# Patient Record
Sex: MALE | Race: BLACK OR AFRICAN AMERICAN | NOT HISPANIC OR LATINO | URBAN - METROPOLITAN AREA
[De-identification: names, ages, dates, MRNs, and addresses within clinical notes are randomized per-mention and may not be internally consistent; named-entity substitution may affect disease eponyms.]

---

## 2018-02-22 ENCOUNTER — EMERGENCY (EMERGENCY)
Facility: HOSPITAL | Age: 33
LOS: 1 days | Discharge: HOME | End: 2018-02-22
Attending: EMERGENCY MEDICINE | Admitting: EMERGENCY MEDICINE
Payer: COMMERCIAL

## 2018-02-22 VITALS
TEMPERATURE: 98 F | DIASTOLIC BLOOD PRESSURE: 84 MMHG | RESPIRATION RATE: 18 BRPM | WEIGHT: 214.95 LBS | OXYGEN SATURATION: 98 % | HEIGHT: 70 IN | SYSTOLIC BLOOD PRESSURE: 128 MMHG | HEART RATE: 73 BPM

## 2018-02-22 VITALS
DIASTOLIC BLOOD PRESSURE: 76 MMHG | OXYGEN SATURATION: 100 % | SYSTOLIC BLOOD PRESSURE: 131 MMHG | HEART RATE: 73 BPM | RESPIRATION RATE: 17 BRPM | TEMPERATURE: 98 F

## 2018-02-22 LAB
ALBUMIN SERPL ELPH-MCNC: 4.2 G/DL — SIGNIFICANT CHANGE UP (ref 3.3–5)
ALP SERPL-CCNC: 82 U/L — SIGNIFICANT CHANGE UP (ref 40–120)
ALT FLD-CCNC: 125 U/L — HIGH (ref 12–78)
ANION GAP SERPL CALC-SCNC: 7 MMOL/L — SIGNIFICANT CHANGE UP (ref 5–17)
AST SERPL-CCNC: 80 U/L — HIGH (ref 15–37)
BASOPHILS # BLD AUTO: 0.1 K/UL — SIGNIFICANT CHANGE UP (ref 0–0.2)
BASOPHILS NFR BLD AUTO: 1.3 % — SIGNIFICANT CHANGE UP (ref 0–2)
BILIRUB SERPL-MCNC: 0.8 MG/DL — SIGNIFICANT CHANGE UP (ref 0.2–1.2)
BUN SERPL-MCNC: 16 MG/DL — SIGNIFICANT CHANGE UP (ref 7–23)
CALCIUM SERPL-MCNC: 9.4 MG/DL — SIGNIFICANT CHANGE UP (ref 8.5–10.1)
CHLORIDE SERPL-SCNC: 103 MMOL/L — SIGNIFICANT CHANGE UP (ref 96–108)
CO2 SERPL-SCNC: 26 MMOL/L — SIGNIFICANT CHANGE UP (ref 22–31)
CREAT SERPL-MCNC: 1 MG/DL — SIGNIFICANT CHANGE UP (ref 0.5–1.3)
EOSINOPHIL # BLD AUTO: 0.1 K/UL — SIGNIFICANT CHANGE UP (ref 0–0.5)
EOSINOPHIL NFR BLD AUTO: 1.3 % — SIGNIFICANT CHANGE UP (ref 0–6)
GLUCOSE SERPL-MCNC: 134 MG/DL — HIGH (ref 70–99)
HCT VFR BLD CALC: 48.6 % — SIGNIFICANT CHANGE UP (ref 39–50)
HGB BLD-MCNC: 16.2 G/DL — SIGNIFICANT CHANGE UP (ref 13–17)
LYMPHOCYTES # BLD AUTO: 2.1 K/UL — SIGNIFICANT CHANGE UP (ref 1–3.3)
LYMPHOCYTES # BLD AUTO: 20.3 % — SIGNIFICANT CHANGE UP (ref 13–44)
MCHC RBC-ENTMCNC: 29.3 PG — SIGNIFICANT CHANGE UP (ref 27–34)
MCHC RBC-ENTMCNC: 33.3 GM/DL — SIGNIFICANT CHANGE UP (ref 32–36)
MCV RBC AUTO: 88 FL — SIGNIFICANT CHANGE UP (ref 80–100)
MONOCYTES # BLD AUTO: 0.8 K/UL — SIGNIFICANT CHANGE UP (ref 0–0.9)
MONOCYTES NFR BLD AUTO: 8.1 % — SIGNIFICANT CHANGE UP (ref 1–9)
NEUTROPHILS # BLD AUTO: 7.1 K/UL — SIGNIFICANT CHANGE UP (ref 1.8–7.4)
NEUTROPHILS NFR BLD AUTO: 68.8 % — SIGNIFICANT CHANGE UP (ref 43–77)
PLATELET # BLD AUTO: 242 K/UL — SIGNIFICANT CHANGE UP (ref 150–400)
POTASSIUM SERPL-MCNC: 4.4 MMOL/L — SIGNIFICANT CHANGE UP (ref 3.5–5.3)
POTASSIUM SERPL-SCNC: 4.4 MMOL/L — SIGNIFICANT CHANGE UP (ref 3.5–5.3)
PROT SERPL-MCNC: 8.7 G/DL — HIGH (ref 6–8.3)
RBC # BLD: 5.52 M/UL — SIGNIFICANT CHANGE UP (ref 4.2–5.8)
RBC # FLD: 12.5 % — SIGNIFICANT CHANGE UP (ref 10.3–14.5)
SODIUM SERPL-SCNC: 136 MMOL/L — SIGNIFICANT CHANGE UP (ref 135–145)
WBC # BLD: 10.3 K/UL — SIGNIFICANT CHANGE UP (ref 3.8–10.5)
WBC # FLD AUTO: 10.3 K/UL — SIGNIFICANT CHANGE UP (ref 3.8–10.5)

## 2018-02-22 PROCEDURE — 85027 COMPLETE CBC AUTOMATED: CPT

## 2018-02-22 PROCEDURE — 96374 THER/PROPH/DIAG INJ IV PUSH: CPT

## 2018-02-22 PROCEDURE — 99284 EMERGENCY DEPT VISIT MOD MDM: CPT | Mod: 25

## 2018-02-22 PROCEDURE — 80053 COMPREHEN METABOLIC PANEL: CPT

## 2018-02-22 PROCEDURE — 70450 CT HEAD/BRAIN W/O DYE: CPT | Mod: 26

## 2018-02-22 PROCEDURE — 96375 TX/PRO/DX INJ NEW DRUG ADDON: CPT

## 2018-02-22 PROCEDURE — 70450 CT HEAD/BRAIN W/O DYE: CPT

## 2018-02-22 PROCEDURE — 99284 EMERGENCY DEPT VISIT MOD MDM: CPT

## 2018-02-22 RX ORDER — IBUPROFEN 200 MG
1 TABLET ORAL
Qty: 20 | Refills: 0 | OUTPATIENT
Start: 2018-02-22 | End: 2018-02-26

## 2018-02-22 RX ORDER — CYCLOBENZAPRINE HYDROCHLORIDE 10 MG/1
1 TABLET, FILM COATED ORAL
Qty: 9 | Refills: 0 | OUTPATIENT
Start: 2018-02-22 | End: 2018-02-24

## 2018-02-22 RX ORDER — METOCLOPRAMIDE HCL 10 MG
10 TABLET ORAL ONCE
Qty: 0 | Refills: 0 | Status: COMPLETED | OUTPATIENT
Start: 2018-02-22 | End: 2018-02-22

## 2018-02-22 RX ORDER — KETOROLAC TROMETHAMINE 30 MG/ML
15 SYRINGE (ML) INJECTION ONCE
Qty: 0 | Refills: 0 | Status: DISCONTINUED | OUTPATIENT
Start: 2018-02-22 | End: 2018-02-22

## 2018-02-22 RX ORDER — SODIUM CHLORIDE 9 MG/ML
1000 INJECTION INTRAMUSCULAR; INTRAVENOUS; SUBCUTANEOUS ONCE
Qty: 0 | Refills: 0 | Status: COMPLETED | OUTPATIENT
Start: 2018-02-22 | End: 2018-02-22

## 2018-02-22 RX ADMIN — SODIUM CHLORIDE 1000 MILLILITER(S): 9 INJECTION INTRAMUSCULAR; INTRAVENOUS; SUBCUTANEOUS at 11:45

## 2018-02-22 RX ADMIN — Medication 10 MILLIGRAM(S): at 11:46

## 2018-02-22 RX ADMIN — Medication 125 MILLIGRAM(S): at 11:46

## 2018-02-22 RX ADMIN — Medication 15 MILLIGRAM(S): at 11:46

## 2018-02-22 NOTE — ED PROVIDER NOTE - ATTENDING CONTRIBUTION TO CARE
Pt is a 31 yo male who presents to the ED with a cc of HA.  Denies significant past medical history.  Pt reports that for the last 2 weeks he has been experiencing a left posterior/ occipital HA.  He reports that the pain has been fairly constant but does appear to wax and wane in intensity.  Pt states that Motrin will relief the symptoms but when the medication wears off the pain returns.  Denies fever, chills, visual changes, N/V, CP, SOB, abd pain, ext numbness or weakness.  Pt denies trauma to the head or nec.  He has had similar HA in the past but states that they normally have not lasted this long.  On exam pt lying in bed NAD, PERRL, EOMI, heart RRR, lungs CTA, abd soft NT/ND, CN II-XII intact no focal deficits, no meningeal signs noted.  No midline C/T/L TTP TTP to left cervical paraspinal region with increased tone and spasm.  Will medicate for migraine HA, check cbc, cmp, CT head.  Agree with above plan of care

## 2018-02-22 NOTE — ED PROVIDER NOTE - OBJECTIVE STATEMENT
31 yo male presents with left side headache, neck spasm x 2 weeks, has been taking otc motrin which helps temporarily.  denies fever, no change in vision, no nausea, no vomiting, no chest pain, no sob, no weakness.   states he has had similar headaches in the past.

## 2019-09-16 ENCOUNTER — APPOINTMENT (OUTPATIENT)
Dept: CARDIOLOGY | Facility: CLINIC | Age: 34
End: 2019-09-16
Payer: MEDICAID

## 2019-09-16 VITALS
WEIGHT: 204 LBS | RESPIRATION RATE: 17 BRPM | BODY MASS INDEX: 30.21 KG/M2 | HEART RATE: 56 BPM | DIASTOLIC BLOOD PRESSURE: 89 MMHG | OXYGEN SATURATION: 100 % | HEIGHT: 69 IN | SYSTOLIC BLOOD PRESSURE: 133 MMHG

## 2019-09-16 VITALS — SYSTOLIC BLOOD PRESSURE: 126 MMHG | DIASTOLIC BLOOD PRESSURE: 78 MMHG

## 2019-09-16 DIAGNOSIS — Z83.3 FAMILY HISTORY OF DIABETES MELLITUS: ICD-10-CM

## 2019-09-16 DIAGNOSIS — E78.5 HYPERLIPIDEMIA, UNSPECIFIED: ICD-10-CM

## 2019-09-16 DIAGNOSIS — R53.83 OTHER FATIGUE: ICD-10-CM

## 2019-09-16 PROCEDURE — 99205 OFFICE O/P NEW HI 60 MIN: CPT

## 2019-09-16 PROCEDURE — 93000 ELECTROCARDIOGRAM COMPLETE: CPT

## 2019-12-11 NOTE — ED ADULT NURSE NOTE - PRO INTERPRETER NEED 2
PreOp Instructions given:   - Verbal medication information (what to hold and what to take)   - NPO guidelines   - Arrival place directions given; time to be given the day before procedure by the   Surgeon's Office ARRIVAL TO [facility] - PT'S HUI WILL BE DRIVING PT HOME POST OP.  - Bathing with antibacterial soap   - Don't wear any jewelry or bring any valuables AM of surgery   - No makeup or moisturizer to face   - No perfume/cologne, powder, lotions or aftershave   Pt. verbalized understanding.   Denies any personal or family history of side effects or issues with anesthesia or sedation.   English

## 2021-10-18 ENCOUNTER — EMERGENCY (EMERGENCY)
Facility: HOSPITAL | Age: 36
LOS: 1 days | Discharge: LEFT BEFORE DISCHARGE | End: 2021-10-18
Attending: STUDENT IN AN ORGANIZED HEALTH CARE EDUCATION/TRAINING PROGRAM | Admitting: EMERGENCY MEDICINE
Payer: COMMERCIAL

## 2021-10-18 VITALS
HEART RATE: 62 BPM | HEIGHT: 70 IN | DIASTOLIC BLOOD PRESSURE: 99 MMHG | WEIGHT: 195.11 LBS | RESPIRATION RATE: 15 BRPM | SYSTOLIC BLOOD PRESSURE: 148 MMHG | TEMPERATURE: 97 F | OXYGEN SATURATION: 100 %

## 2021-10-18 PROCEDURE — L9991: CPT

## 2021-10-18 PROCEDURE — 99284 EMERGENCY DEPT VISIT MOD MDM: CPT

## 2024-02-19 NOTE — ED ADULT NURSE NOTE - GLASGOW COMA SCALE
Diagnosis:   Neck pain (M54.2)   Strain of right shoulder, subsequent encounter (S44.021M)          Comments:  Neck pain with shoulder pain - 2-3 times per week for 2 weeks       Referring Provider: Ac Horn  Date of Evaluation:    1/25/2024    Precautions:  None      Neck Disability Index Score  No data recorded Next MD visit:   none scheduled  Date of Surgery: n/a   Insurance Primary/Secondary: [employer] / N/A     # Auth Visits: 12            MRI of the right shoulder: mild AC joint arthritis and low grade subscapularis irregularity/tearing.  Intact remainder of rotator cuff tendons.  Type II acromion.     Subjective:   R shoulder feeling better. Able to do light ADL's without difficulty.  Pt reports that he is feeling much better after coming to physical therapy.  KT helped.    Pain:  1/10 upper trap      Objective: Tx flow sheet     Cervical AROM: (* denotes performed with pain)  Flexion: 30 (was:20-pain)  Extension: 40 (was:30-pain)  Sidebending: B 30 (was:20-tight R side)  Rotation: B 50 (Was:R 30-stiff; L 35)    Shoulder AROM:standing  Flexion  R 125*(was:110*) L wnl  Abduction R 160* (was:110*) L wnl  ER R 50* (was:40*) L 60, supine R 60  IR R T10* (was:T12*) L T6, supine R 50    Observation/Posture: FHP 15 degrees (was:25 degrees), 10 degrees upper cervical extensionrounded shoulders, MIN muscle guarding R Shoulder    Accessory motion: decreased R lateral glides C4-6, NO mm guarding w CPA C2-5, tenderness R A  Palpation: tenderness and tension R upper/mid trap, scalenes, supraspinatus, levator scapula , min tenderness R AC joint and AC ligament    Strength: (* denotes performed with pain)  UE/Scapular   Shoulder Flex: R 4/5 (was:4-/5*), L 5/5  Shoulder ABD (C5): R 4/5 (was:4-/5*), L 5/5  Biceps (C6): R 5/5, L 5/5  Wrist ext (C6): R 5/5, L 5/5  Triceps (C7): R 5/5, L 5/5  Wrist Flex (C7): R 5/5, L 5/5  Digit Flex (C8): R 5/5, L 5/5  Thumb Ext (C8): R 5/5, L 5/5  Interossei (T1): R 5/5, L  5/5    Rhomboids: R 4/5 (was:4-/5*), L 5/5  Mid trap: R 4/5 (was:4-/5*); L 5/5  Lats: R nt, L nt  Low trap: R 4-/5*; L 5/5           Assessment:   Pt reported resolution of dull ache R upper trap after manual therapy.  Improved R shoulder flexion to 100% without pain after addition of Manual therapy: to R AC joint and AC ligament.   Pt responded well to functional manual release with shoulder PROM which initially was tender but resolved.  Pt tolerated progression to strengthening and stretching exs without difficulty, including lite RC strengthening with addition of scapular strengthening.   Manual and verbal cueing to correct R scapular elevation with R shoulder flexion which pt started to self correct throughout session.  Continued KT R upper trap for decompression of soft tissue.    Goals:   Goals: (to be met in 10 visits) -progressing  Pt will improve cervical AROM flexion by 20 degrees to improve tolerance for looking down to tie shoes   Pt will improve cervical AROM extension by 20 degrees to improve tolerance for putting dishes into overhead cabinets   Pt will improve cervical AROM rotation to >20 degrees to improve tolerance for turning head to check blind spot while driving  Pt will improve postural strength (mid/low trap) to 5/5 to promote improved upright posturing and decreased pain with lifting   Pt will improve shoulder flexion AROM to >/=150 degrees to be able to reach into overhead cabinets without pain or restriction -MET END OF SESSION  Pt will improve shoulder abduction AROM to >/=150 degrees to improve ability to don deodorant, don/doff shirts, and wash hair -MET END OF SESSION  Pt will increase shoulder AROM ER to 90 to be able to reach and fasten seatbelt   Pt will increase shoulder AROM IR to 70 to be able to reach in back pocket, tuck in shirt, and turn steering wheel without pain  Pt will improve shoulder strength throughout to 5/5 to improve function with lifting and reaching   Pt will be  independent and compliant with comprehensive HEP to maintain progress achieved in PT      Plan: Continue plan of care as pt tolerates with focus on neck rehab. Next visit: lite strengthening, Next visit: test strengthening and range of motion   Date:    1/31/2024              TX#: 3/ Date:        2/6/2024          TX#: 4/ Date:      2/8/2024            TX#: 5/ Date: 2/13/2024   Tx#: 6/ Date:  2/15/2024   Tx#: 7/ Date:   Tx#: 8/ Date:   Tx#: 9/ Date:   Tx#: 10/ Date:   Tx#: 11 Date:   Tx#: 12/   Ther Ex:     Subj assessment      Scifit F/B 3 min ea L1 hills    Pulleys x30    Chin tuck x10    Scap retraction + depression red spri 2x10    B shoulder ext red spri 2x10      Doorway stretch 20 sec x2    TRX shoulder stretch wall out 5 sec hold x10 w scap retraction and depression    Roll ball up wall x10 w chin tuck    R Upper trap stretch -  20 sec x3    Posture ed Ther Ex:     Subj assessment      Scifit F/B 3 min ea L2     Scap retraction + depression red spri 2x10    B shoulder ext red spri 2x10    ER YTB x10  IR YTB x10    Doorway stretch 20 sec x2    Roll ball up wall x20 w chin tuck    R Upper trap stretch -  20 sec x3    Standing in front of mirror-  B shoulder flex x10  B shoulder abduction x10  Manual and verbal cueing    PROM R shoulder w functional manual release R supraspinatus , cervical spine    Posture ed    Pt education: use of tennis/golf ball for ischemic compression w functional manual release for HEP     Ther Ex:     Subj assessment      Scifit F/B 3 min ea L2     Scap retraction + depression red spri 2x10    B shoulder ext red spri 2x10    ER RTB x10  IR RTB x10    Doorway stretch 20 sec x2    Roll ball up wall x20 w chin tuck    R Upper trap stretch -  20 sec x3    Standing in front of mirror-  B shoulder flex x10  B shoulder abduction x10  Manual and verbal cueing    PROM R shoulder w functional manual release R supraspinatus , cervical spine    Posture ed    1/2 FR-B, alt UE overhead x10    SB,  lev scap, rotation c-spine with OP x2 ea     Ther Ex:     Subj assessment      Scifit F/B 3 min ea L3    Scap retraction + depression cable 10# 2x10    B shoulder ext cable 10# 2x10    Thoracic rotation with cervical rotation cable 10# R/L 2x10    1/2 foam roller-standing  Alt shoulder flexion  Scaption  1# x10    Thoracic rotation-R/L -hands on wall    R Upper trap stretch -  20 sec x3 Ther Ex:     Subj assessment      Scifit F/B 3 min ea L3    Wall wipes red ball x20 w chin tuck    ER YTB x10  IR YTB x10    Prone-  Scap retraction/depression+  Sh ext  Habd  Row  X10 ea    Scap retraction + depression cable 10# x20    B shoulder ext cable 10# x20    Thoracic rotation with cervical rotation cable 10# R/L x20    1/2 foam roller-standing  Alt shoulder flexion  B shoulder flexion  Scaption  1# x10    Thoracic rotation w habd R/L -hands on wall    R Upper trap stretch -  20 sec x3    KT R upper trap        Manual therapy:   R upper trap, cervical paraspinals, levator scapula, scalenes    GIASTM upper trap, cervical paraspinals, levator scapula, scalenes Manual therapy:   R upper trap, cervical paraspinals, levator scapula, scalenes, supraspinatus     GIASTM upper trap, cervical paraspinals, levator scapula, scalenes Manual therapy:   R upper trap, cervical paraspinals, levator scapula, scalenes, supraspinatus     GIASTM upper trap, cervical paraspinals, levator scapula, scalenes Manual therapy:   R upper trap, cervical paraspinals, levator scapula, scalenes, supraspinatus     GIASTM upper trap, cervical paraspinals, levator scapula, scalenes Manual therapy:   R upper trap, cervical paraspinals, levator scapula, scalenes, supraspinatus , R AC joint and AC ligament    GIASTM upper trap, cervical paraspinals, levator scapula, scalenes        MHP R upper trap/cervical 5 min MHP R upper trap/cervical 2 min MHP R upper trap/cervical 2 min MHP R upper trap/cervical 2 min MHP R upper trap/cervical 2 min                    HEP:  scap retraction x10, pendulum x10-for pain, c-spine SB x3 20 sec, cervical rotation x10-w and wo towel, cane-flex x10, flexion up wall x10, abduction sitting w towel x10 BID    Charges: Tex2 30', [place] 15' [facility]-n/c 2 min       Total Timed Treatment: 45 min  Total Treatment Time: 47 min               baseline